# Patient Record
(demographics unavailable — no encounter records)

---

## 2024-12-12 NOTE — HISTORY OF PRESENT ILLNESS
[FreeTextEntry1] : 88 yo complaining of right sided breast pain for a week and ahalf. SHe admits to using a garden clipper and thought that was it but hasnt gone away, but has gotten better. Pt states that when she would palpate it would hurt

## 2024-12-12 NOTE — REVIEW OF SYSTEMS
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

## 2024-12-12 NOTE — PHYSICAL EXAM
[Chaperone Declined] : Patient declined chaperone [Appropriately responsive] : appropriately responsive [Alert] : alert [No Acute Distress] : no acute distress [FreeTextEntry2] : no muscular pain on ROM or palpation of chest wall, no costochondritis pain [FreeTextEntry6] : nontender opn palpation today  [Examination Of The Breasts] : a normal appearance [Normal] : normal [No Masses] : no breast masses were palpable

## 2025-01-11 NOTE — PROCEDURE
[FreeTextEntry3] : Ultrasound-guided placement of Wen localizing clip into right breast cancer  After informed consent was obtained, 1% lidocaine was infiltrated to the skin and a Wen localizing clip was placed into a 2 cm mass in the upper outer right breast  BI-RADS 6

## 2025-01-11 NOTE — HISTORY OF PRESENT ILLNESS
[FreeTextEntry1] : Referred by Duyen BANG Patient is diagnosed with right breast invasive carcinoma; ER/ID positive, HER-2 negative. Patient reports pain to the right breast on 12/5/2024 prior to the biopsy. No family history of breast cancer.  A 2 cm spiculated mass is reported in the upper outer right breast  Tissue sampling was performed

## 2025-01-11 NOTE — ASSESSMENT
[FreeTextEntry1] : Right breast cancer  Care plan reviewed with patient and daughter  Advised right breast lumpectomy  Post procedure mammogram advised  Postoperative medical and radiation oncology evaluation  A total of 1 hour was spent in consultation

## 2025-01-11 NOTE — HISTORY OF PRESENT ILLNESS
[FreeTextEntry1] : Referred by Duyen BANG Patient is diagnosed with right breast invasive carcinoma; ER/NC positive, HER-2 negative. Patient reports pain to the right breast on 12/5/2024 prior to the biopsy. No family history of breast cancer.  A 2 cm spiculated mass is reported in the upper outer right breast  Tissue sampling was performed

## 2025-02-05 NOTE — OB/GYN HISTORY
[History of Hormone Replacement Therapy] : a history of hormone replacement therapy [Currently In Menopause] : currently in menopause [___] : Living: [unfilled]

## 2025-02-07 NOTE — PHYSICAL EXAM
[Normal] : oriented to person, place and time, the affect was normal, the mood was normal and not anxious [de-identified] : bra cup size C

## 2025-02-07 NOTE — PHYSICAL EXAM
[Normal] : oriented to person, place and time, the affect was normal, the mood was normal and not anxious [de-identified] : bra cup size C

## 2025-02-07 NOTE — VITALS
[Maximal Pain Intensity: 0/10] : 0/10 [NoTreatment Scheduled] : no treatment scheduled [90: Able to carry normal activity; minor signs or symptoms of disease.] : 90: Able to carry normal activity; minor signs or symptoms of disease.  [Date: ____________] : Patient's last distress assessment performed on [unfilled]. [1 - Distress Level] : Distress Level: 1 [Least Pain Intensity: 0/10] : 0/10 [80: Normal activity with effort; some signs or symptoms of disease.] : 80: Normal activity with effort; some signs or symptoms of disease.  [ECOG Performance Status: 1 - Restricted in physically strenuous activity but ambulatory and able to carry out work of a light or sedentary nature] : Performance Status: 1 - Restricted in physically strenuous activity but ambulatory and able to carry out work of a light or sedentary nature, e.g., light house work, office work

## 2025-02-07 NOTE — DISEASE MANAGEMENT
[Clinical] : TNM Stage: c [IB] : IB [FreeTextEntry4] : invasive mammary carcinoma, ER+CT+Her2- [TTNM] : 2 [NTNM] : 0 [MTNM] : 0

## 2025-02-07 NOTE — LETTER CLOSING
[Consult Closing:] : Thank you for allowing me to participate in the care of this patient.  If you have any questions, please do not hesitate to contact me. [Sincerely yours,] : Sincerely yours, [FreeTextEntry3] : Lit Cyr MD Physician in Chief Department of Radiation Medicine [facility]   of Radiation Medicine [employer] at  [facility]  Radiation  [facility]/ [facility] at [place] [address]  Tel: [phone] Fax: [phone]

## 2025-02-07 NOTE — LETTER GREETING
[Dear Doctor] : Dear Doctor, [Consult Letter:] : Your patient, [unfilled] was seen in my office today for consultation. [Please see my note below.] : Please see my note below. [FreeTextEntry2] : Bean Hall MD

## 2025-02-07 NOTE — DISEASE MANAGEMENT
[Clinical] : TNM Stage: c [IB] : IB [FreeTextEntry4] : invasive mammary carcinoma, ER+VT+Her2- [TTNM] : 2 [NTNM] : 0 [MTNM] : 0

## 2025-02-07 NOTE — HISTORY OF PRESENT ILLNESS
[FreeTextEntry1] : This 87-year-old female presents for radiation medicine consultation.  Ms. Russ was diagnosed with Right breast invasive mammary carcinoma on 12/26/2024.   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  Bilateral mammogram/songram was then performed on 12/19/2024, results as follows:  EXAM: [id number] - US BREAST COMPLETE BI  - ORDERED BY: MCKENNA SIMEON  EXAM: [id number] - MG MAMMO DIAG W ZAIDA BI#  - ORDERED BY: MCKENNA SIMEON  PROCEDURE DATE:  12/19/2024  INTERPRETATION:  MAMMOGRAM:  CLINICAL INDICATION: Patient is 87 years old and is seen for screening. The patient has no personal history of cancer. The patient has no family history of breast cancer.  The patient reported some right breast pain.  RISK ASSESSMENT: Tyrer-Cuzick Lifetime Risk: 0.1%  LAST CLINICAL BREAST EXAM: The patient reports she does not know when her last clinical breast exam was performed.  COMPARISON STUDIES: None.  TECHNIQUE: Bilateral mammography was performed including CC and MLO views.  Additional imaging analysis was performed using CAD (computer-aided detection) software.  Digital breast tomosynthesis was performed and used in the interpretation of images.  FINDINGS: There are scattered areas of fibroglandular density.  In the upper-outer right breast, a mass with spiculation and architectural distortion is identified.  There are no suspicious microcalcifications.  A few scattered bilateral benign calcifications are present.  A loop recorder overlies a portion of the medial left breast.  No suspicious axillary lymph nodes are noted mammographically.  BREAST ARTERIAL CALCIFICATION (GIDEON): Grade 1 - Few punctate vascular calcifications. No tram track or ring calcifications.  Note: A strong association between breast arterial calcification and cardiovascular disease has been reported. Correlation with other cardiovascular risk factors is recommended.  BILATERAL BREAST ULTRASOUND COMPLETE  CLINICAL INDICATION: Ultrasound was requested in conjunction with today's mammogram. Further evaluation of suspicious mammographic finding.  COMPARISON STUDIES: None available.  RIGHT BREAST: Sonographic evaluation of the right breast was performed in its entirety, including each of the four quadrants and the retroareolar region, in clockwise fashion. Images were obtained by  the technologist and submitted for interpretation.  FINDINGS: The breast parenchyma demonstrates a heterogeneous background echotexture (mixed fatty and fibroglandular).  At 11:00, 8 cm from the nipple is a 2.1 x 1.8 x 1.9 cm irregular hypoechoic mass with unsharp margins. This correlates with the suspicious mammographic mass. Ultrasound-guided biopsy is recommended.  At 9:00, 5 cm from the nipple a 0.3 cm cyst is noted.  Evaluation of the axilla shows no suspicious axillary lymph nodes.  LEFT BREAST: Sonographic evaluation of the left breast was performed in its entirety, including each of the four quadrants and the retroareolar region, in clockwise fashion. Images were obtained by  the technologist and submitted for interpretation.  FINDINGS: The breast parenchyma demonstrates a heterogeneous background echotexture (mixed fatty and fibroglandular).  No cysts nor solid masses are noted in the left breast.  IMPRESSION: Suspicious right breast mass at 11:00, 8 cm from the nipple. Ultrasound-guided biopsy recommended.  The patient was informed of these results and recommendations in person.  Findings and recommendations were relayed to [employer] via [employer] secure email on 12/19/2024.  RECOMMENDATION:  Ultrasound biopsy.  BI-RADS 5- Highly Suggestive for Malignancy  These recommendations were explained to the patient, who will also be mailed a written summary in layman's terms.  --- End of Report ---  12/26/2024 Surgical Pathology Report - Auth (Verified) Specimen(s) Submitted 1 Right breast 11:00 8cmfn  Final Diagnosis Breast, right, 11:00 8 cmfn, core biopsy: - Invasive mammary carcinoma, well-differentiated, Grade 1 (2+2+1) - Invasive carcinoma measures at least 14 mm in this sample - Calcifications identified in invasive carcinoma - Results of ER, SC, and HER2 will be reported in an addendum  Addendum Report - Auth (Verified) Addendum IMMUNOHISTOCHEMICAL ANALYSIS OF BREAST CANCER BIOMARKERS  RESULT:   Right breast, 11:00 8 cm FN: Core biopsy  ESTROGEN RECEPTOR (ER): Positive: >95% strong nuclear staining  PROGESTERONE RECEPTOR (PgR): Positive: 10% moderate nuclear staining  HER-2: Negative: 1+ membrane staining  Ms. Russ is scheduled for breast surgery with Dr. Hall on 2/14/2025.

## 2025-02-07 NOTE — REVIEW OF SYSTEMS
[Negative] : Allergic/Immunologic [FreeTextEntry3] : glaucoma [FreeTextEntry5] : Hx heart failure, A-fib, hypertension, hyperlipidemia [de-identified] : Hx thyroid disease [FreeTextEntry9] : osteopenia [de-identified] : polycythemia vera

## 2025-02-07 NOTE — LETTER CLOSING
[Consult Closing:] : Thank you for allowing me to participate in the care of this patient.  If you have any questions, please do not hesitate to contact me. [Sincerely yours,] : Sincerely yours, [FreeTextEntry3] : Lit Cyr MD Physician in Chief Department of Radiation Medicine [facility]   of Radiation Medicine [employer] at  [facility]  Radiation  [facility]/ [facility] at [place] [address]  Tel: [phone] Fax: [phone]

## 2025-02-07 NOTE — HISTORY OF PRESENT ILLNESS
[FreeTextEntry1] : This 87-year-old female presents for radiation medicine consultation.  Ms. Russ was diagnosed with Right breast invasive mammary carcinoma on 12/26/2024.   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  Bilateral mammogram/songram was then performed on 12/19/2024, results as follows:  EXAM: [id number] - US BREAST COMPLETE BI  - ORDERED BY: MCKENNA SIMEON  EXAM: [id number] - MG MAMMO DIAG W ZAIDA BI#  - ORDERED BY: MCKENNA SIMEON  PROCEDURE DATE:  12/19/2024  INTERPRETATION:  MAMMOGRAM:  CLINICAL INDICATION: Patient is 87 years old and is seen for screening. The patient has no personal history of cancer. The patient has no family history of breast cancer.  The patient reported some right breast pain.  RISK ASSESSMENT: Tyrer-Cuzick Lifetime Risk: 0.1%  LAST CLINICAL BREAST EXAM: The patient reports she does not know when her last clinical breast exam was performed.  COMPARISON STUDIES: None.  TECHNIQUE: Bilateral mammography was performed including CC and MLO views.  Additional imaging analysis was performed using CAD (computer-aided detection) software.  Digital breast tomosynthesis was performed and used in the interpretation of images.  FINDINGS: There are scattered areas of fibroglandular density.  In the upper-outer right breast, a mass with spiculation and architectural distortion is identified.  There are no suspicious microcalcifications.  A few scattered bilateral benign calcifications are present.  A loop recorder overlies a portion of the medial left breast.  No suspicious axillary lymph nodes are noted mammographically.  BREAST ARTERIAL CALCIFICATION (GIDEON): Grade 1 - Few punctate vascular calcifications. No tram track or ring calcifications.  Note: A strong association between breast arterial calcification and cardiovascular disease has been reported. Correlation with other cardiovascular risk factors is recommended.  BILATERAL BREAST ULTRASOUND COMPLETE  CLINICAL INDICATION: Ultrasound was requested in conjunction with today's mammogram. Further evaluation of suspicious mammographic finding.  COMPARISON STUDIES: None available.  RIGHT BREAST: Sonographic evaluation of the right breast was performed in its entirety, including each of the four quadrants and the retroareolar region, in clockwise fashion. Images were obtained by  the technologist and submitted for interpretation.  FINDINGS: The breast parenchyma demonstrates a heterogeneous background echotexture (mixed fatty and fibroglandular).  At 11:00, 8 cm from the nipple is a 2.1 x 1.8 x 1.9 cm irregular hypoechoic mass with unsharp margins. This correlates with the suspicious mammographic mass. Ultrasound-guided biopsy is recommended.  At 9:00, 5 cm from the nipple a 0.3 cm cyst is noted.  Evaluation of the axilla shows no suspicious axillary lymph nodes.  LEFT BREAST: Sonographic evaluation of the left breast was performed in its entirety, including each of the four quadrants and the retroareolar region, in clockwise fashion. Images were obtained by  the technologist and submitted for interpretation.  FINDINGS: The breast parenchyma demonstrates a heterogeneous background echotexture (mixed fatty and fibroglandular).  No cysts nor solid masses are noted in the left breast.  IMPRESSION: Suspicious right breast mass at 11:00, 8 cm from the nipple. Ultrasound-guided biopsy recommended.  The patient was informed of these results and recommendations in person.  Findings and recommendations were relayed to [employer] via [employer] secure email on 12/19/2024.  RECOMMENDATION:  Ultrasound biopsy.  BI-RADS 5- Highly Suggestive for Malignancy  These recommendations were explained to the patient, who will also be mailed a written summary in layman's terms.  --- End of Report ---  12/26/2024 Surgical Pathology Report - Auth (Verified) Specimen(s) Submitted 1 Right breast 11:00 8cmfn  Final Diagnosis Breast, right, 11:00 8 cmfn, core biopsy: - Invasive mammary carcinoma, well-differentiated, Grade 1 (2+2+1) - Invasive carcinoma measures at least 14 mm in this sample - Calcifications identified in invasive carcinoma - Results of ER, MT, and HER2 will be reported in an addendum  Addendum Report - Auth (Verified) Addendum IMMUNOHISTOCHEMICAL ANALYSIS OF BREAST CANCER BIOMARKERS  RESULT:   Right breast, 11:00 8 cm FN: Core biopsy  ESTROGEN RECEPTOR (ER): Positive: >95% strong nuclear staining  PROGESTERONE RECEPTOR (PgR): Positive: 10% moderate nuclear staining  HER-2: Negative: 1+ membrane staining  Ms. Russ is scheduled for breast surgery with Dr. Hall on 2/14/2025.

## 2025-02-07 NOTE — REVIEW OF SYSTEMS
[Negative] : Allergic/Immunologic [FreeTextEntry3] : glaucoma [FreeTextEntry5] : Hx heart failure, A-fib, hypertension, hyperlipidemia [FreeTextEntry9] : osteopenia [de-identified] : Hx thyroid disease [de-identified] : polycythemia vera

## 2025-02-22 NOTE — ASSESSMENT
[FreeTextEntry1] : Wen localizing signal appreciated in upper outer right breast  There is no palpable abnormality  Care plan was reviewed with patient and her daughter  A total of 30 minutes was spent in consultation

## 2025-02-22 NOTE — HISTORY OF PRESENT ILLNESS
[FreeTextEntry1] : Patient returns to the office for interval assessment  She has a history for right breast cancer  She has completed medical clearance  She denies palpable breast mass

## 2025-03-08 NOTE — HISTORY OF PRESENT ILLNESS
[FreeTextEntry1] : Status post right breast lumpectomy  Incision healing well  Margins clear  Patient assisted in scheduling follow-up medical and radiation oncology evaluation

## 2025-04-03 NOTE — REVIEW OF SYSTEMS
[Negative] : Allergic/Immunologic [FreeTextEntry3] : glaucoma [FreeTextEntry5] : Hx heart failure, A-fib, hypertension, hyperlipidemia [FreeTextEntry9] : osteopenia [de-identified] : Hx thyroid disease [de-identified] : polycythemia vera

## 2025-04-03 NOTE — REVIEW OF SYSTEMS
[Negative] : Allergic/Immunologic [FreeTextEntry3] : glaucoma [FreeTextEntry5] : Hx heart failure, A-fib, hypertension, hyperlipidemia [FreeTextEntry9] : osteopenia [de-identified] : Hx thyroid disease [de-identified] : polycythemia vera

## 2025-04-03 NOTE — HISTORY OF PRESENT ILLNESS
[FreeTextEntry1] : This 87-year-old female presents for radiation medicine follow-up accompanied by.  Ms. Russ was diagnosed with Right breast invasive mammary carcinoma on 12/26/2024.   RISK ASSESSMENT: Tyrer-Cuzick Lifetime Risk: 0.1%  Ms. Russ is s/p Right breast lumpectomy with Dr. Hall on 2/14/2025.  [facility] [employer] Accession Number : [id number] Patient:     JOEL RUSS Accession:                             [id number]  Collected Date/Time:                   2/14/2025 09:49 EST Received Date/Time:                    2/14/2025 10:43 EST  Surgical Pathology Report - Auth (Verified)  Specimen(s) Submitted 1  Right breast lumpectomy 2  Right breast lumpectomy anterior margin 3  Right breast lumpectomy medial margin 4  Right breast lumpectomy superior margin 5  Right breast lumpectomy lateral margin 6  Right breast lumpectomy inferior margin 7  Right breast lumpectomy posterior margin  Final Diagnosis 1.  Right breast lumpectomy: -   Invasive ductal carcinoma, moderately differentiated, 18.0 mm in greatest dimension with microcalcifications (pT1c, pNx). 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 -   Biopsy site change. -   Immunostain for E-cadherin appears positive in the tumor cells, supporting the ductal phenotype.  2.  Right breast lumpectomy anterior margin: -   Fibrocystic changes including usual duct hyperplasia and apocrine metaplasia.  3.  Right breast lumpectomy medial margin: -   Fibroadipose tissue, negative for carcinoma.  4.  Right breast lumpectomy superior margin: -   Fibroadipose tissue, negative for carcinoma.  5.  Right breast lumpectomy lateral margin: -   Breast tissue, negative for carcinoma.  6.  Right breast lumpectomy inferior margin: -   Benign breast tissue, negative for carcinoma.  7.  Right breast lumpectomy posterior margin: -   Fibroadipose tissue, negative for carcinoma.  Verified by: Nida Riojas MD (Electronic Signature) Reported on: 02/21/25 11:56 EST, [facility], [address] [place] [address] Phone: [phone]   Fax: [phone]  Ms. Russ reports she continues on anastrozole at the direction of Dr. Glynn.

## 2025-04-03 NOTE — HISTORY OF PRESENT ILLNESS
[FreeTextEntry1] : This 87-year-old female presents for radiation medicine follow-up accompanied by.  Ms. Russ was diagnosed with Right breast invasive mammary carcinoma on 12/26/2024.   RISK ASSESSMENT: Tyrer-Cuzick Lifetime Risk: 0.1%  Ms. Russ is s/p Right breast lumpectomy with Dr. Hall on 2/14/2025.  [facility] Accession Number : [id number] Patient:     JOEL RUSS Accession:                             [id number]  Collected Date/Time:                   2/14/2025 09:49 EST Received Date/Time:                    2/14/2025 10:43 EST  Surgical Pathology Report - Auth (Verified)  Specimen(s) Submitted 1  Right breast lumpectomy 2  Right breast lumpectomy anterior margin 3  Right breast lumpectomy medial margin 4  Right breast lumpectomy superior margin 5  Right breast lumpectomy lateral margin 6  Right breast lumpectomy inferior margin 7  Right breast lumpectomy posterior margin  Final Diagnosis 1.  Right breast lumpectomy: -   Invasive ductal carcinoma, moderately differentiated, 18.0 mm in greatest dimension with microcalcifications (pT1c, pNx). 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 -   Biopsy site change. -   Immunostain for E-cadherin appears positive in the tumor cells, supporting the ductal phenotype.  2.  Right breast lumpectomy anterior margin: -   Fibrocystic changes including usual duct hyperplasia and apocrine metaplasia.  3.  Right breast lumpectomy medial margin: -   Fibroadipose tissue, negative for carcinoma.  4.  Right breast lumpectomy superior margin: -   Fibroadipose tissue, negative for carcinoma.  5.  Right breast lumpectomy lateral margin: -   Breast tissue, negative for carcinoma.  6.  Right breast lumpectomy inferior margin: -   Benign breast tissue, negative for carcinoma.  7.  Right breast lumpectomy posterior margin: -   Fibroadipose tissue, negative for carcinoma.  Verified by: Nida Riojas MD (Electronic Signature) Reported on: 02/21/25 11:56 EST, [facility], [address] [place] [address] Phone: [phone]   Fax: [phone]  Ms. Russ reports she continues on anastrozole at the direction of Dr. Glynn.

## 2025-04-03 NOTE — DISEASE MANAGEMENT
[Clinical] : TNM Stage: c [FreeTextEntry4] : invasive mammary carcinoma, ER+RI+Her2- [TTNM] : 2 [MTNM] : 0 [NTNM] : 0 [IB] : IB

## 2025-04-03 NOTE — DISEASE MANAGEMENT
[Clinical] : TNM Stage: c [FreeTextEntry4] : invasive mammary carcinoma, ER+NV+Her2- [TTNM] : 2 [MTNM] : 0 [NTNM] : 0 [IB] : IB